# Patient Record
Sex: FEMALE | Race: WHITE
[De-identification: names, ages, dates, MRNs, and addresses within clinical notes are randomized per-mention and may not be internally consistent; named-entity substitution may affect disease eponyms.]

---

## 2020-04-18 ENCOUNTER — HOSPITAL ENCOUNTER (EMERGENCY)
Dept: HOSPITAL 25 - ED | Age: 25
Discharge: HOME | End: 2020-04-18
Payer: COMMERCIAL

## 2020-04-18 VITALS — DIASTOLIC BLOOD PRESSURE: 75 MMHG | SYSTOLIC BLOOD PRESSURE: 115 MMHG

## 2020-04-18 DIAGNOSIS — G43.909: Primary | ICD-10-CM

## 2020-04-18 DIAGNOSIS — R11.0: ICD-10-CM

## 2020-04-18 PROCEDURE — 96375 TX/PRO/DX INJ NEW DRUG ADDON: CPT

## 2020-04-18 PROCEDURE — 99282 EMERGENCY DEPT VISIT SF MDM: CPT

## 2020-04-18 PROCEDURE — 96374 THER/PROPH/DIAG INJ IV PUSH: CPT

## 2020-04-18 PROCEDURE — 96361 HYDRATE IV INFUSION ADD-ON: CPT

## 2020-04-18 RX ADMIN — SODIUM CHLORIDE ONE MLS/HR: 900 IRRIGANT IRRIGATION at 00:48

## 2020-04-18 RX ADMIN — SODIUM CHLORIDE ONE MLS/HR: 900 IRRIGANT IRRIGATION at 00:47

## 2020-04-18 NOTE — ED
Headache





- HPI Summary


HPI Summary: 





This pt is a 25 Y/O F presenting to [facility] with a CC of a headache that began 

yesterday at 0200. She states that she has been taking her usual medications 

without relief and has been sleeping throughout the day. She states that she 

still has a headache, described as her regular migraine and is currently rated 

a 5/10 in severity. She states that she has photophobia and lights have been 

aggravating her symptoms. She also reports nausea. She denies any fevers, chills

, SOB, CP, vomiting, myalgia, and sore throat. She states no other PMHx. She 

has no alleviating factors even after ingesting her prescribed migraine 

medications and Advil. 





- History Of Current Complaint


Chief Complaint: EDHeadache


Stated Complaint: HEADACHE PER PT


Time Seen by Provider: 04/18/20 00:25


Hx Obtained From: Patient


Last Known Well Date: 4/17/2020 before 0200


Onset/Duration: Sudden Onset, Still Present


Initially Headache Was: Initial Pain Scale(0-10)= - 5


Currently Pain Is: Current Pain Scale(0-10)= - 5


Timing: Constant


Character: Migraine


Aggravating Factor: Bright Lights


Allevating Factors: Nothing


Associated Signs And Symptoms: Negative - fevers, chills, SOB, CP, vomiting, 

myalgia, and sore throat, Nausea, Visual Changes - photophobia





- Allergies/Home Medications


Allergies/Adverse Reactions: 


 Allergies











Allergy/AdvReac Type Severity Reaction Status Date / Time


 


No Known Allergies Allergy   Verified 04/18/20 00:18














PMH/Surg Hx/FS Hx/Imm Hx


Previously Healthy: Yes


Endocrine/Hematology History: 


   Denies: Hx Diabetes


Cardiovascular History: 


   Denies: Hx Hypertension


Respiratory History: 


   Denies: Hx Asthma


Neurological History: Reports: Hx Headaches, Hx Migraine





- Cancer History


Hx Chemotherapy: No


Hx Radiation Therapy: No





- Surgical History


Surgical History: None





- Immunization History


Immunizations Up to Date: Yes


Infectious Disease History: No


Infectious Disease History: 


   Denies: Traveled Outside the [place] in Last 30 Days





- Family History


Known Family History: Positive: Hypertension, Diabetes





- Social History


Occupation: Student -  


Lives: Dormitory/Roommates


Hx Substance Use: No


Substance Use Type: Reports: None


Hx Tobacco Use: No


Smoking Status (MU): Never Smoked Tobacco





Review of Systems


Negative: Fever, Chills


Negative: Sore Throat


Negative: Chest Pain


Negative: Shortness Of Breath


Positive: Nausea.  Negative: Abdominal Pain, Vomiting


Negative: Myalgia


Positive: Headache


All Other Systems Reviewed And Are Negative: Yes





Physical Exam





- Summary


Physical Exam Summary: 





Appearance: Well-appearing, Well-nourished, lying in bed comfortably


Skin: Warm, dry, no obvious rash


Eyes: sclera anicteric, no conjunctival pallor


ENT: mucous membranes moist, pharynx appears normal


Neck: Supple, nontender


Respiratory: Clear to auscultation, no signs of respiratory distress


Cardiovascular: Normal S1, S2. No murmurs. Normal distal pulses in tibial and 

radial bilaterally.


Abdomen: Soft, nontender, normal active bowel sounds present


Musculoskeletal: Normal, Strength/ROM Intact


Neurological: A&Ox3, awake and alert, mentation is normal, speech is fluent and 

appropriate


Psychiatric: affect is normal, does not appear anxious or depressed





Triage Information Reviewed: Yes


Vital Signs On Initial Exam: 


 Initial Vitals











Temp Pulse Resp BP Pulse Ox


 


 97.9 F   84   16   112/80   99 


 


 04/18/20 00:15  04/18/20 00:15  04/18/20 00:15  04/18/20 00:15  04/18/20 00:15











Vital Signs Reviewed: Yes





Procedures





- Sedation


Patient Received Moderate/Deep Sedation with Procedure: No





Diagnostics





- Vital Signs


 Vital Signs











  Temp Pulse Resp BP Pulse Ox


 


 04/18/20 00:15  97.9 F  84  16  112/80  99














- Laboratory


Lab Statement: Any lab studies that have been ordered have been reviewed, and 

results considered in the medical decision making process.





Headache Course/Dx





- Course


Course Of Treatment: This pt is a 25 Y/O F presenting to [facility] with a CC of a 

headache that began yesterday at 0200. She states that she has been taking her 

usual medications without relief and has been sleeping throughout the day. She 

states that she still has a headache, described as her regular migraine and is 

currently rated a 5/10 in severity. She states that she has photophobia and 

lights have been aggravating her symptoms.  Her PE had no acute abnormalities.  

She was given torodal, compazine, IV fluids, and benadryl.  She was discharged 

home with a Dx of a migraine headache.





- Diagnoses


Provider Diagnoses: 


 Migraine headache








- Critical Care Time


Critical Care Statement: Critical care time is provided exclusive of any time 

spent performing procedures.





Discharge ED





- Sign-Out/Discharge


Documenting (check all that apply): Patient Departure - discharge 





- Discharge Plan


Condition: Improved


Disposition: HOME


Patient Education Materials:  Migraine Headache (ED)


Referrals: 


[facility] [Outside] - If Needed





- Billing Disposition and Condition


Condition: IMPROVED


Disposition: Home





- Attestation Statements


Document Initiated by Jean-Pierreibe: Yes


Documenting Scribe: Nasir Tan


Provider For Whom Scribe is Documenting (Include Credential): Vinicio Kilpatrick MD


Scribe Attestation: 


Nasir NORRIS, scribed for Vinicio Kilpatrick MD on 04/18/20 at 0333. 


Scribe Documentation Reviewed: Yes


Provider Attestation: 


The documentation as recorded by the Nasir lewis accurately reflects 

the service I personally performed and the decisions made by me, Vinicio Kilpatrick MD


Status of Scribe Document: Viewed